# Patient Record
Sex: FEMALE | Race: OTHER | HISPANIC OR LATINO | URBAN - METROPOLITAN AREA
[De-identification: names, ages, dates, MRNs, and addresses within clinical notes are randomized per-mention and may not be internally consistent; named-entity substitution may affect disease eponyms.]

---

## 2022-03-30 ENCOUNTER — OUTPATIENT (OUTPATIENT)
Dept: OUTPATIENT SERVICES | Facility: HOSPITAL | Age: 47
LOS: 1 days | Discharge: HOME | End: 2022-03-30
Payer: MEDICAID

## 2022-03-30 VITALS
WEIGHT: 156.53 LBS | SYSTOLIC BLOOD PRESSURE: 141 MMHG | OXYGEN SATURATION: 99 % | TEMPERATURE: 98 F | RESPIRATION RATE: 15 BRPM | DIASTOLIC BLOOD PRESSURE: 87 MMHG | HEIGHT: 62 IN | HEART RATE: 87 BPM

## 2022-03-30 DIAGNOSIS — Z01.818 ENCOUNTER FOR OTHER PREPROCEDURAL EXAMINATION: ICD-10-CM

## 2022-03-30 DIAGNOSIS — G56.01 CARPAL TUNNEL SYNDROME, RIGHT UPPER LIMB: ICD-10-CM

## 2022-03-30 DIAGNOSIS — Z90.3 ACQUIRED ABSENCE OF STOMACH [PART OF]: Chronic | ICD-10-CM

## 2022-03-30 DIAGNOSIS — Z90.49 ACQUIRED ABSENCE OF OTHER SPECIFIED PARTS OF DIGESTIVE TRACT: Chronic | ICD-10-CM

## 2022-03-30 LAB
ALBUMIN SERPL ELPH-MCNC: 4.2 G/DL — SIGNIFICANT CHANGE UP (ref 3.5–5.2)
ALP SERPL-CCNC: 77 U/L — SIGNIFICANT CHANGE UP (ref 30–115)
ALT FLD-CCNC: 12 U/L — SIGNIFICANT CHANGE UP (ref 0–41)
ANION GAP SERPL CALC-SCNC: 14 MMOL/L — SIGNIFICANT CHANGE UP (ref 7–14)
AST SERPL-CCNC: 14 U/L — SIGNIFICANT CHANGE UP (ref 0–41)
BASOPHILS # BLD AUTO: 0.07 K/UL — SIGNIFICANT CHANGE UP (ref 0–0.2)
BASOPHILS NFR BLD AUTO: 1 % — SIGNIFICANT CHANGE UP (ref 0–1)
BILIRUB SERPL-MCNC: 0.3 MG/DL — SIGNIFICANT CHANGE UP (ref 0.2–1.2)
BUN SERPL-MCNC: 14 MG/DL — SIGNIFICANT CHANGE UP (ref 10–20)
CALCIUM SERPL-MCNC: 9.5 MG/DL — SIGNIFICANT CHANGE UP (ref 8.5–10.1)
CHLORIDE SERPL-SCNC: 102 MMOL/L — SIGNIFICANT CHANGE UP (ref 98–110)
CO2 SERPL-SCNC: 24 MMOL/L — SIGNIFICANT CHANGE UP (ref 17–32)
CREAT SERPL-MCNC: 0.6 MG/DL — LOW (ref 0.7–1.5)
EGFR: 113 ML/MIN/1.73M2 — SIGNIFICANT CHANGE UP
EOSINOPHIL # BLD AUTO: 0.34 K/UL — SIGNIFICANT CHANGE UP (ref 0–0.7)
EOSINOPHIL NFR BLD AUTO: 4.8 % — SIGNIFICANT CHANGE UP (ref 0–8)
GLUCOSE SERPL-MCNC: 74 MG/DL — SIGNIFICANT CHANGE UP (ref 70–99)
HCT VFR BLD CALC: 41.5 % — SIGNIFICANT CHANGE UP (ref 37–47)
HGB BLD-MCNC: 13.5 G/DL — SIGNIFICANT CHANGE UP (ref 12–16)
IMM GRANULOCYTES NFR BLD AUTO: 0.6 % — HIGH (ref 0.1–0.3)
LYMPHOCYTES # BLD AUTO: 2.3 K/UL — SIGNIFICANT CHANGE UP (ref 1.2–3.4)
LYMPHOCYTES # BLD AUTO: 32.2 % — SIGNIFICANT CHANGE UP (ref 20.5–51.1)
MCHC RBC-ENTMCNC: 29.2 PG — SIGNIFICANT CHANGE UP (ref 27–31)
MCHC RBC-ENTMCNC: 32.5 G/DL — SIGNIFICANT CHANGE UP (ref 32–37)
MCV RBC AUTO: 89.6 FL — SIGNIFICANT CHANGE UP (ref 81–99)
MONOCYTES # BLD AUTO: 0.56 K/UL — SIGNIFICANT CHANGE UP (ref 0.1–0.6)
MONOCYTES NFR BLD AUTO: 7.8 % — SIGNIFICANT CHANGE UP (ref 1.7–9.3)
NEUTROPHILS # BLD AUTO: 3.83 K/UL — SIGNIFICANT CHANGE UP (ref 1.4–6.5)
NEUTROPHILS NFR BLD AUTO: 53.6 % — SIGNIFICANT CHANGE UP (ref 42.2–75.2)
NRBC # BLD: 0 /100 WBCS — SIGNIFICANT CHANGE UP (ref 0–0)
PLATELET # BLD AUTO: 299 K/UL — SIGNIFICANT CHANGE UP (ref 130–400)
POTASSIUM SERPL-MCNC: 4.2 MMOL/L — SIGNIFICANT CHANGE UP (ref 3.5–5)
POTASSIUM SERPL-SCNC: 4.2 MMOL/L — SIGNIFICANT CHANGE UP (ref 3.5–5)
PROT SERPL-MCNC: 7.3 G/DL — SIGNIFICANT CHANGE UP (ref 6–8)
RBC # BLD: 4.63 M/UL — SIGNIFICANT CHANGE UP (ref 4.2–5.4)
RBC # FLD: 15.3 % — HIGH (ref 11.5–14.5)
SODIUM SERPL-SCNC: 140 MMOL/L — SIGNIFICANT CHANGE UP (ref 135–146)
WBC # BLD: 7.14 K/UL — SIGNIFICANT CHANGE UP (ref 4.8–10.8)
WBC # FLD AUTO: 7.14 K/UL — SIGNIFICANT CHANGE UP (ref 4.8–10.8)

## 2022-03-30 PROCEDURE — 93010 ELECTROCARDIOGRAM REPORT: CPT

## 2022-03-30 NOTE — H&P PST ADULT - REASON FOR ADMISSION
PT SCHEDULED FOR-- RIGHT HAND OPEN CARPAL TUNNEL RELEASE, RIGHT WRIST DEQUERVAINS RELEASE  Laterality: Right Length of Procedure: 45 Minutes Anesthesia Type: Local Standby  PT REPORTS--MY RIGHT HAND AND RIGHT WRIST HAVE BEEN BOTHERING ME SINCE 10/21.  THE PAIN HAS GOTTEN WORSE RECENTLY. THE PAIN IS 8/10. IT IS A PRESSURE, BURNING AND RADIATING PAIN.   PT POINTS TO HER RIGHT ARM.   SHOWING THE PAIN RADIATING  UP AND DOWN HER RIGHT. ARM.  AS PER PT- NOTHING TAKES THE PAIN AWAY.

## 2022-03-30 NOTE — H&P PST ADULT - HISTORY OF PRESENT ILLNESS
PT PRESENTS TO PAST WITH NO SOB, CP, PALPITATIONS, DYSURIA, UTI OR URI AT PRESENT.   PT ABLE TO WALK UP 2-3 FLIGHTS OF STEPS WITH NO SOB.  AS PER THE PT, THIS IS HIS/HER COMPLETE MEDICAL AND SURGICAL HX, INCLUDING MEDICATIONS PRESCRIBED AND OVER THE COUNTER  pt denies any covid s/s, 1/22  tested positive.   PT AWARE OF DATE AND TIME OF COVID TESTING PRIOR TO SURGERY.   pt advised self quarantine till day of procedure  denies travel outside the [place] in the past 30 days  Anesthesia Alert  NO--Difficult Airway  NO--History of neck surgery or radiation  NO--Limited ROM of neck  NO--History of Malignant hyperthermia  NO--Personal or family history of Pseudocholinesterase deficiency  NO--Prior Anesthesia Complication  NO--Latex Allergy  NO--Loose teeth  NO--History of Rheumatoid Arthritis  NO--BEBETO  NO BLEEDING RISK  NO--Other_____

## 2022-03-30 NOTE — H&P PST ADULT - NSANTHOSAYNRD_GEN_A_CORE
No. BEBETO screening performed.  STOP BANG Legend: 0-2 = LOW Risk; 3-4 = INTERMEDIATE Risk; 5-8 = HIGH Risk

## 2022-03-30 NOTE — H&P PST ADULT - EXTREMITIES COMMENTS
RIGHT HAND BRACE  FROM ABOVE FINGERS AND ABOVE WRIST.  PT ABLE TO ROTATE WRIST FREELY.  FINGERS PINK AND WARM TO TOUCH.

## 2022-04-13 ENCOUNTER — OUTPATIENT (OUTPATIENT)
Dept: OUTPATIENT SERVICES | Facility: HOSPITAL | Age: 47
LOS: 1 days | Discharge: HOME | End: 2022-04-13

## 2022-04-13 ENCOUNTER — TRANSCRIPTION ENCOUNTER (OUTPATIENT)
Age: 47
End: 2022-04-13

## 2022-04-13 VITALS
WEIGHT: 156.53 LBS | HEIGHT: 62 IN | OXYGEN SATURATION: 98 % | RESPIRATION RATE: 20 BRPM | HEART RATE: 57 BPM | TEMPERATURE: 99 F | DIASTOLIC BLOOD PRESSURE: 81 MMHG | SYSTOLIC BLOOD PRESSURE: 126 MMHG

## 2022-04-13 VITALS
RESPIRATION RATE: 15 BRPM | SYSTOLIC BLOOD PRESSURE: 146 MMHG | OXYGEN SATURATION: 100 % | DIASTOLIC BLOOD PRESSURE: 93 MMHG | HEART RATE: 52 BPM

## 2022-04-13 DIAGNOSIS — Z90.3 ACQUIRED ABSENCE OF STOMACH [PART OF]: Chronic | ICD-10-CM

## 2022-04-13 DIAGNOSIS — Z90.49 ACQUIRED ABSENCE OF OTHER SPECIFIED PARTS OF DIGESTIVE TRACT: Chronic | ICD-10-CM

## 2022-04-13 RX ORDER — IBUPROFEN 200 MG
1 TABLET ORAL
Qty: 20 | Refills: 0
Start: 2022-04-13

## 2022-04-13 RX ORDER — ONDANSETRON 8 MG/1
4 TABLET, FILM COATED ORAL ONCE
Refills: 0 | Status: DISCONTINUED | OUTPATIENT
Start: 2022-04-13 | End: 2022-04-27

## 2022-04-13 RX ORDER — HYDROMORPHONE HYDROCHLORIDE 2 MG/ML
0.5 INJECTION INTRAMUSCULAR; INTRAVENOUS; SUBCUTANEOUS
Refills: 0 | Status: DISCONTINUED | OUTPATIENT
Start: 2022-04-13 | End: 2022-04-13

## 2022-04-13 RX ORDER — SODIUM CHLORIDE 9 MG/ML
1000 INJECTION, SOLUTION INTRAVENOUS
Refills: 0 | Status: DISCONTINUED | OUTPATIENT
Start: 2022-04-13 | End: 2022-04-27

## 2022-04-13 RX ADMIN — SODIUM CHLORIDE 100 MILLILITER(S): 9 INJECTION, SOLUTION INTRAVENOUS at 08:17

## 2022-04-13 NOTE — BRIEF OPERATIVE NOTE - NSICDXBRIEFPOSTOP_GEN_ALL_CORE_FT
POST-OP DIAGNOSIS:  Right carpal tunnel syndrome 13-Apr-2022 07:07:45  Harjeet Meek  Tendinitis, de Quervain's 13-Apr-2022 07:07:49  Harjeet Meek

## 2022-04-13 NOTE — BRIEF OPERATIVE NOTE - NSICDXBRIEFPREOP_GEN_ALL_CORE_FT
PRE-OP DIAGNOSIS:  Right carpal tunnel syndrome 13-Apr-2022 07:07:34  Harjeet Meek  Tendinitis, de Quervain's 13-Apr-2022 07:07:38  Harjeet Meek

## 2022-04-13 NOTE — ASU DISCHARGE PLAN (ADULT/PEDIATRIC) - CARE PROVIDER_API CALL
Harjeet Meek)  Orthopaedic Surgery  [address] [place] [address]  Phone: [phone]  Fax: [phone]  Follow Up Time:

## 2022-04-13 NOTE — CHART NOTE - NSCHARTNOTEFT_GEN_A_CORE
PACU ANESTHESIA ADMISSION NOTE      Procedure:   Post op diagnosis:      ____  Intubated  TV:______       Rate: ______      FiO2: ______    __x__  Patent Airway    __x__  Full return of protective reflexes    __x__  Full recovery from anesthesia / back to baseline     Vitals:      See Anesthesia Record      Mental Status:  _x___ Awake   __x___ Alert   _____ Drowsy   _____ Sedated    Nausea/Vomiting:  _x___ NO  ______Yes,   __x__ See Post - Op Orders          Pain Scale (0-10):  __0___    Treatment: ____ None    __x__ See Post - Op/PCA Orders    Post - Operative Fluids:   ____ Oral   __x__ See Post - Op Orders    Plan: Discharge:   _x___Home       _____Floor     _____Critical Care    _____  Other:_________________    Comments: Uneventful anesthesia. Patient transported to PACU awake and responsive, spontaneously breathing and hemodynamically stable. Report given to PACU RN at bedside

## 2022-04-13 NOTE — BRIEF OPERATIVE NOTE - NSICDXBRIEFPROCEDURE_GEN_ALL_CORE_FT
PROCEDURES:  Carpal tunnel surgery 13-Apr-2022 07:07:23  Harjeet Meek Quervain release 13-Apr-2022 07:07:27  Harjeet Meek

## 2022-04-19 DIAGNOSIS — G56.01 CARPAL TUNNEL SYNDROME, RIGHT UPPER LIMB: ICD-10-CM

## 2022-04-19 DIAGNOSIS — M65.4 RADIAL STYLOID TENOSYNOVITIS [DE QUERVAIN]: ICD-10-CM

## 2023-08-12 ENCOUNTER — EMERGENCY (EMERGENCY)
Facility: HOSPITAL | Age: 48
LOS: 0 days | Discharge: LEFT BEFORE DISCHARGE | End: 2023-08-13
Attending: EMERGENCY MEDICINE
Payer: MEDICAID

## 2023-08-12 VITALS
WEIGHT: 169.98 LBS | RESPIRATION RATE: 17 BRPM | SYSTOLIC BLOOD PRESSURE: 136 MMHG | OXYGEN SATURATION: 100 % | HEIGHT: 62 IN | HEART RATE: 85 BPM | DIASTOLIC BLOOD PRESSURE: 84 MMHG | TEMPERATURE: 99 F

## 2023-08-12 DIAGNOSIS — Z90.3 ACQUIRED ABSENCE OF STOMACH [PART OF]: Chronic | ICD-10-CM

## 2023-08-12 DIAGNOSIS — Z98.84 BARIATRIC SURGERY STATUS: ICD-10-CM

## 2023-08-12 DIAGNOSIS — R10.32 LEFT LOWER QUADRANT PAIN: ICD-10-CM

## 2023-08-12 DIAGNOSIS — Z90.49 ACQUIRED ABSENCE OF OTHER SPECIFIED PARTS OF DIGESTIVE TRACT: Chronic | ICD-10-CM

## 2023-08-12 DIAGNOSIS — Z53.29 PROCEDURE AND TREATMENT NOT CARRIED OUT BECAUSE OF PATIENT'S DECISION FOR OTHER REASONS: ICD-10-CM

## 2023-08-12 DIAGNOSIS — K57.92 DIVERTICULITIS OF INTESTINE, PART UNSPECIFIED, WITHOUT PERFORATION OR ABSCESS WITHOUT BLEEDING: ICD-10-CM

## 2023-08-12 DIAGNOSIS — R11.0 NAUSEA: ICD-10-CM

## 2023-08-12 DIAGNOSIS — J45.909 UNSPECIFIED ASTHMA, UNCOMPLICATED: ICD-10-CM

## 2023-08-12 PROCEDURE — 81003 URINALYSIS AUTO W/O SCOPE: CPT

## 2023-08-12 PROCEDURE — 99285 EMERGENCY DEPT VISIT HI MDM: CPT

## 2023-08-12 PROCEDURE — 99285 EMERGENCY DEPT VISIT HI MDM: CPT | Mod: 25

## 2023-08-12 PROCEDURE — 85018 HEMOGLOBIN: CPT

## 2023-08-12 PROCEDURE — 96374 THER/PROPH/DIAG INJ IV PUSH: CPT

## 2023-08-12 PROCEDURE — 71045 X-RAY EXAM CHEST 1 VIEW: CPT

## 2023-08-12 PROCEDURE — 84484 ASSAY OF TROPONIN QUANT: CPT

## 2023-08-12 PROCEDURE — 96375 TX/PRO/DX INJ NEW DRUG ADDON: CPT

## 2023-08-12 PROCEDURE — 83605 ASSAY OF LACTIC ACID: CPT

## 2023-08-12 PROCEDURE — 74177 CT ABD & PELVIS W/CONTRAST: CPT | Mod: MA

## 2023-08-12 PROCEDURE — 84703 CHORIONIC GONADOTROPIN ASSAY: CPT

## 2023-08-12 PROCEDURE — 83690 ASSAY OF LIPASE: CPT

## 2023-08-12 PROCEDURE — 82803 BLOOD GASES ANY COMBINATION: CPT

## 2023-08-12 PROCEDURE — 84132 ASSAY OF SERUM POTASSIUM: CPT

## 2023-08-12 PROCEDURE — 80048 BASIC METABOLIC PNL TOTAL CA: CPT

## 2023-08-12 PROCEDURE — 80307 DRUG TEST PRSMV CHEM ANLYZR: CPT

## 2023-08-12 PROCEDURE — 85014 HEMATOCRIT: CPT

## 2023-08-12 PROCEDURE — 82330 ASSAY OF CALCIUM: CPT

## 2023-08-12 PROCEDURE — 93005 ELECTROCARDIOGRAM TRACING: CPT

## 2023-08-12 PROCEDURE — 80076 HEPATIC FUNCTION PANEL: CPT

## 2023-08-12 PROCEDURE — 85025 COMPLETE CBC W/AUTO DIFF WBC: CPT

## 2023-08-12 PROCEDURE — 76830 TRANSVAGINAL US NON-OB: CPT

## 2023-08-12 PROCEDURE — 84295 ASSAY OF SERUM SODIUM: CPT

## 2023-08-12 PROCEDURE — 36415 COLL VENOUS BLD VENIPUNCTURE: CPT

## 2023-08-12 RX ORDER — MORPHINE SULFATE 50 MG/1
4 CAPSULE, EXTENDED RELEASE ORAL ONCE
Refills: 0 | Status: DISCONTINUED | OUTPATIENT
Start: 2023-08-12 | End: 2023-08-12

## 2023-08-12 RX ORDER — DIATRIZOATE MEGLUMINE 180 MG/ML
30 INJECTION, SOLUTION INTRAVESICAL ONCE
Refills: 0 | Status: COMPLETED | OUTPATIENT
Start: 2023-08-12 | End: 2023-08-12

## 2023-08-12 RX ORDER — SODIUM CHLORIDE 9 MG/ML
1000 INJECTION INTRAMUSCULAR; INTRAVENOUS; SUBCUTANEOUS ONCE
Refills: 0 | Status: COMPLETED | OUTPATIENT
Start: 2023-08-12 | End: 2023-08-12

## 2023-08-12 NOTE — ED ADULT TRIAGE NOTE - AS TEMP SITE
"-- Message is from the [facility]--    Reason for Call: The patients dad is calling in wanting to know what was prescribe for the patient doctors office for today. I told him I see the one medication the doctor prescribed but that [facility] sees two medications in there. Patients dad wants to know if he has to get that other medication over the counter. That medication is [place] Laxative Drops 1.5 mL. Please call the patients dad back at [phone]       Type Contact Phone    01/08/2019 12:01 PM Phone (Incoming) Pratik Sylvester (Father) [phone]          Alternative phone number:     Turnaround time given to caller: ""This message will be sent to [facility] Provider's name]. The clinical team will fulfill your request as soon as they review your message. \""    " oral

## 2023-08-12 NOTE — ED ADULT TRIAGE NOTE - CHIEF COMPLAINT QUOTE
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 Now the pain is getting worse here (left upper and lower quadrants). I did go to the bathroom, it helped the pain a little but not much - patient

## 2023-08-13 PROBLEM — J45.909 UNSPECIFIED ASTHMA, UNCOMPLICATED: Chronic | Status: ACTIVE | Noted: 2022-03-30

## 2023-08-13 LAB
ALBUMIN SERPL ELPH-MCNC: 4.3 G/DL — SIGNIFICANT CHANGE UP (ref 3.5–5.2)
ALP SERPL-CCNC: 95 U/L — SIGNIFICANT CHANGE UP (ref 30–115)
ALT FLD-CCNC: 14 U/L — SIGNIFICANT CHANGE UP (ref 0–41)
ANION GAP SERPL CALC-SCNC: 13 MMOL/L — SIGNIFICANT CHANGE UP (ref 7–14)
APPEARANCE UR: CLEAR — SIGNIFICANT CHANGE UP
AST SERPL-CCNC: 17 U/L — SIGNIFICANT CHANGE UP (ref 0–41)
BASE EXCESS BLDV CALC-SCNC: 2.5 MMOL/L — SIGNIFICANT CHANGE UP (ref -2–3)
BASOPHILS # BLD AUTO: 0.08 K/UL — SIGNIFICANT CHANGE UP (ref 0–0.2)
BASOPHILS NFR BLD AUTO: 0.5 % — SIGNIFICANT CHANGE UP (ref 0–1)
BILIRUB DIRECT SERPL-MCNC: <0.2 MG/DL — SIGNIFICANT CHANGE UP (ref 0–0.3)
BILIRUB INDIRECT FLD-MCNC: SIGNIFICANT CHANGE UP MG/DL (ref 0.2–1.2)
BILIRUB SERPL-MCNC: 0.3 MG/DL — SIGNIFICANT CHANGE UP (ref 0.2–1.2)
BILIRUB UR-MCNC: NEGATIVE — SIGNIFICANT CHANGE UP
BUN SERPL-MCNC: 18 MG/DL — SIGNIFICANT CHANGE UP (ref 10–20)
CA-I SERPL-SCNC: 1.22 MMOL/L — SIGNIFICANT CHANGE UP (ref 1.15–1.33)
CALCIUM SERPL-MCNC: 9.5 MG/DL — SIGNIFICANT CHANGE UP (ref 8.4–10.5)
CHLORIDE SERPL-SCNC: 100 MMOL/L — SIGNIFICANT CHANGE UP (ref 98–110)
CO2 SERPL-SCNC: 23 MMOL/L — SIGNIFICANT CHANGE UP (ref 17–32)
COLOR SPEC: YELLOW — SIGNIFICANT CHANGE UP
CREAT SERPL-MCNC: 0.9 MG/DL — SIGNIFICANT CHANGE UP (ref 0.7–1.5)
DIFF PNL FLD: NEGATIVE — SIGNIFICANT CHANGE UP
EGFR: 80 ML/MIN/1.73M2 — SIGNIFICANT CHANGE UP
EOSINOPHIL # BLD AUTO: 0.33 K/UL — SIGNIFICANT CHANGE UP (ref 0–0.7)
EOSINOPHIL NFR BLD AUTO: 2.1 % — SIGNIFICANT CHANGE UP (ref 0–8)
ETHANOL SERPL-MCNC: <10 MG/DL — SIGNIFICANT CHANGE UP
GAS PNL BLDV: 134 MMOL/L — LOW (ref 136–145)
GAS PNL BLDV: SIGNIFICANT CHANGE UP
GLUCOSE SERPL-MCNC: 102 MG/DL — HIGH (ref 70–99)
GLUCOSE UR QL: NEGATIVE MG/DL — SIGNIFICANT CHANGE UP
HCG SERPL QL: NEGATIVE — SIGNIFICANT CHANGE UP
HCO3 BLDV-SCNC: 28 MMOL/L — SIGNIFICANT CHANGE UP (ref 22–29)
HCT VFR BLD CALC: 38.5 % — SIGNIFICANT CHANGE UP (ref 37–47)
HCT VFR BLDA CALC: 41 % — SIGNIFICANT CHANGE UP (ref 39–51)
HGB BLD CALC-MCNC: 13.5 G/DL — SIGNIFICANT CHANGE UP (ref 12.6–17.4)
HGB BLD-MCNC: 13.1 G/DL — SIGNIFICANT CHANGE UP (ref 12–16)
IMM GRANULOCYTES NFR BLD AUTO: 0.3 % — SIGNIFICANT CHANGE UP (ref 0.1–0.3)
KETONES UR-MCNC: NEGATIVE MG/DL — SIGNIFICANT CHANGE UP
LACTATE BLDV-MCNC: 1.4 MMOL/L — SIGNIFICANT CHANGE UP (ref 0.5–2)
LACTATE SERPL-SCNC: 1.6 MMOL/L — SIGNIFICANT CHANGE UP (ref 0.7–2)
LEUKOCYTE ESTERASE UR-ACNC: NEGATIVE — SIGNIFICANT CHANGE UP
LIDOCAIN IGE QN: 44 U/L — SIGNIFICANT CHANGE UP (ref 7–60)
LYMPHOCYTES # BLD AUTO: 13.1 % — LOW (ref 20.5–51.1)
LYMPHOCYTES # BLD AUTO: 2.05 K/UL — SIGNIFICANT CHANGE UP (ref 1.2–3.4)
MCHC RBC-ENTMCNC: 31.1 PG — HIGH (ref 27–31)
MCHC RBC-ENTMCNC: 34 G/DL — SIGNIFICANT CHANGE UP (ref 32–37)
MCV RBC AUTO: 91.4 FL — SIGNIFICANT CHANGE UP (ref 81–99)
MONOCYTES # BLD AUTO: 1.15 K/UL — HIGH (ref 0.1–0.6)
MONOCYTES NFR BLD AUTO: 7.4 % — SIGNIFICANT CHANGE UP (ref 1.7–9.3)
NEUTROPHILS # BLD AUTO: 11.95 K/UL — HIGH (ref 1.4–6.5)
NEUTROPHILS NFR BLD AUTO: 76.6 % — HIGH (ref 42.2–75.2)
NITRITE UR-MCNC: NEGATIVE — SIGNIFICANT CHANGE UP
NRBC # BLD: 0 /100 WBCS — SIGNIFICANT CHANGE UP (ref 0–0)
PCO2 BLDV: 48 MMHG — HIGH (ref 39–42)
PH BLDV: 7.38 — SIGNIFICANT CHANGE UP (ref 7.32–7.43)
PH UR: 5.5 — SIGNIFICANT CHANGE UP (ref 5–8)
PLATELET # BLD AUTO: 266 K/UL — SIGNIFICANT CHANGE UP (ref 130–400)
PMV BLD: 10.8 FL — HIGH (ref 7.4–10.4)
PO2 BLDV: 40 MMHG — SIGNIFICANT CHANGE UP
POTASSIUM BLDV-SCNC: 3.8 MMOL/L — SIGNIFICANT CHANGE UP (ref 3.5–5.1)
POTASSIUM SERPL-MCNC: 4.3 MMOL/L — SIGNIFICANT CHANGE UP (ref 3.5–5)
POTASSIUM SERPL-SCNC: 4.3 MMOL/L — SIGNIFICANT CHANGE UP (ref 3.5–5)
PROT SERPL-MCNC: 7.1 G/DL — SIGNIFICANT CHANGE UP (ref 6–8)
PROT UR-MCNC: NEGATIVE MG/DL — SIGNIFICANT CHANGE UP
RBC # BLD: 4.21 M/UL — SIGNIFICANT CHANGE UP (ref 4.2–5.4)
RBC # FLD: 13 % — SIGNIFICANT CHANGE UP (ref 11.5–14.5)
SAO2 % BLDV: 69.8 % — SIGNIFICANT CHANGE UP
SODIUM SERPL-SCNC: 136 MMOL/L — SIGNIFICANT CHANGE UP (ref 135–146)
SP GR SPEC: 1.02 — SIGNIFICANT CHANGE UP (ref 1–1.03)
TROPONIN T SERPL-MCNC: <0.01 NG/ML — SIGNIFICANT CHANGE UP
UROBILINOGEN FLD QL: 0.2 MG/DL — SIGNIFICANT CHANGE UP (ref 0.2–1)
WBC # BLD: 15.6 K/UL — HIGH (ref 4.8–10.8)
WBC # FLD AUTO: 15.6 K/UL — HIGH (ref 4.8–10.8)

## 2023-08-13 PROCEDURE — 74177 CT ABD & PELVIS W/CONTRAST: CPT | Mod: 26,MA

## 2023-08-13 PROCEDURE — 76830 TRANSVAGINAL US NON-OB: CPT | Mod: 26

## 2023-08-13 PROCEDURE — 93010 ELECTROCARDIOGRAM REPORT: CPT

## 2023-08-13 PROCEDURE — 71045 X-RAY EXAM CHEST 1 VIEW: CPT | Mod: 26

## 2023-08-13 RX ORDER — CEFTRIAXONE 500 MG/1
1000 INJECTION, POWDER, FOR SOLUTION INTRAMUSCULAR; INTRAVENOUS ONCE
Refills: 0 | Status: COMPLETED | OUTPATIENT
Start: 2023-08-13 | End: 2023-08-13

## 2023-08-13 RX ORDER — CIPROFLOXACIN LACTATE 400MG/40ML
1 VIAL (ML) INTRAVENOUS
Qty: 20 | Refills: 0
Start: 2023-08-13 | End: 2023-08-22

## 2023-08-13 RX ORDER — METRONIDAZOLE 500 MG
1 TABLET ORAL
Qty: 30 | Refills: 0
Start: 2023-08-13 | End: 2023-08-22

## 2023-08-13 RX ORDER — CIPROFLOXACIN LACTATE 400MG/40ML
400 VIAL (ML) INTRAVENOUS ONCE
Refills: 0 | Status: DISCONTINUED | OUTPATIENT
Start: 2023-08-13 | End: 2023-08-13

## 2023-08-13 RX ORDER — METRONIDAZOLE 500 MG
500 TABLET ORAL ONCE
Refills: 0 | Status: DISCONTINUED | OUTPATIENT
Start: 2023-08-13 | End: 2023-08-13

## 2023-08-13 RX ADMIN — Medication 125 MILLIGRAM(S): at 00:03

## 2023-08-13 RX ADMIN — DIATRIZOATE MEGLUMINE 30 MILLILITER(S): 180 INJECTION, SOLUTION INTRAVESICAL at 00:04

## 2023-08-13 RX ADMIN — Medication 200 MILLIGRAM(S): at 03:20

## 2023-08-13 RX ADMIN — SODIUM CHLORIDE 1000 MILLILITER(S): 9 INJECTION INTRAMUSCULAR; INTRAVENOUS; SUBCUTANEOUS at 00:04

## 2023-08-13 RX ADMIN — MORPHINE SULFATE 4 MILLIGRAM(S): 50 CAPSULE, EXTENDED RELEASE ORAL at 00:02

## 2023-08-13 RX ADMIN — CEFTRIAXONE 100 MILLIGRAM(S): 500 INJECTION, POWDER, FOR SOLUTION INTRAMUSCULAR; INTRAVENOUS at 03:19

## 2023-08-13 RX ADMIN — Medication 100 MILLIGRAM(S): at 03:19

## 2023-08-13 NOTE — ED PROVIDER NOTE - ATTENDING MD FREE TEXT FOR MDM DISCUSSED CASE WITH QUESTION
I Discussed with MAR, but patient changed her mind and signed out AMA, shortly after signing out the admission to MAR and prior to the bed request.

## 2023-08-13 NOTE — ED PROVIDER NOTE - CLINICAL SUMMARY MEDICAL DECISION MAKING FREE TEXT BOX
Laboratory results reviewed and discussed with patient. CBC shows elevated WBC count, BMP shows electrolyte in acceptable range and no JERONIMO. Lactate is WNL.   Pregnancy test is negative.   Patient remained stable in ED, improved well. Patient refused any further medical care in ED, refused admission, refused any further diagnostic studies or treatments. Discussed with patient in detail about clinical condition, and the need for further medical evaluation/treatments, patient verbalized understanding and still refused. Discussed with patient about the risks of leaving AMA, Patient verbalized understanding the information provided and still wanted to leave AMA. Patient is awake, alert, o x 3, coherent, and is capacitated to make decisions. Discussed with patient in detail about clinical condition, results of the diagnostic studies, was told to come back to ED if decide to continue with medical evaluation/treatments, and was discussed about  the need for close out patient follow up. Detail aftercare instructions and return precautions are given.  I had extensive discussion of risks and benefits of pursuing further medical evaluation and/or care with patient and any available family/friends; patient still electing to leave against medical advice. Patient is awake, alert, oriented and demonstrates full capacity and insight into illness. Patient aware and encouraged to return immediately to ED or nearest ED if patient decides to change mind regarding care or if patient experiences any new, worsening, or concerning symptoms.

## 2023-08-13 NOTE — ED PROVIDER NOTE - PHYSICAL EXAMINATION
CONST: Well appearing in NAD  EYES: PERRL, EOMI, Sclera and conjunctiva clear.   ENT: Oropharynx normal appearing, no erythema or exudates. Uvula midline.  CARD: Normal S1 S2; Normal rate and rhythm  RESP: Equal BS B/L, No wheezes, rhonchi or rales. No distress  GI: LLQ tenderness. No rebound or guarding. Abd soft, non distended.  MS: Normal ROM in all extremities. No midline spinal tenderness.  SKIN: Warm, dry, no acute rashes. Good turgor  NEURO: A&Ox3, No focal deficits.

## 2023-08-13 NOTE — ED PROVIDER NOTE - DIFFERENTIAL DIAGNOSIS
cardiac ischemia, cardiac arrhythmia, acute coronary syndromes, symptomatic anemia, electrolyte abnormalities including hyponatremia and hypokalemia, and pneumothorax. Pulmonary embolism and aotric dissection are less likely. Differential Diagnosis

## 2023-08-13 NOTE — CONSULT NOTE ADULT - ASSESSMENT
48yF w/ PMHx of asthma, HTN, s/p gastric sleeve @ [place] in 2016, s/p appendectomy who presented with LLQ abdominal pain associated with nausea x 1 day. Physical exam findings, imaging, and labs as documented above.     PLAN:  - Pending CT imaging  - Attending to see, plans pending    Above plan discussed with Attending Surgeon  ***  , patient, and ED team    SURGERY CONSULT SPECTRA: 6699 X1 or 6191 48yF w/ PMHx of asthma, HTN, s/p gastric sleeve @ [place] in 2016, s/p appendectomy who presented with LLQ abdominal pain associated with nausea x 1 day. Physical exam findings, imaging, and labs as documented above.     PLAN:  - No acute surgical intervention required at this time.  - Recommend antibiotics for 10-14 days  - If admitted to medicine, recommend serial abdominal exams  - Advance diet as tolerated  - Pain control  - Monitor for fevers, chills, worsening pain, nausea or vomiting.   - May follow-up outpatient with Dr. Gilliam for further management if needed.     Dispo per ED  Above plan discussed with Attending Surgeon Dr. Gilliam, patient, and ED team    SURGERY CONSULT SPECTRA: 6699 X1 or 1476 48yF w/ PMHx of asthma, HTN, s/p gastric sleeve @ [place] in 2016, s/p appendectomy who presented with LLQ abdominal pain associated with nausea x 1 day. Physical exam findings, imaging, and labs as documented above.     PLAN:  - No acute surgical intervention required at this time.  - Recommend admit to medicine for IV antibiotics for 10-14 days  - Recommend serial abdominal exams  - Advance diet as tolerated  - Pain control  - Monitor for fevers, chills, worsening pain, nausea or vomiting.   - Surgery to follow    Above plan discussed with Attending Surgeon Dr. Gilliam, patient, and ED team    SURGERY CONSULT SPECTRA: 6699 X1 or 0238 48yF w/ PMHx of asthma, HTN, s/p gastric sleeve @ [place] in 2016, s/p appendectomy who presented with LLQ abdominal pain associated with nausea x 1 day. Physical exam findings, imaging, and labs as documented above.     PLAN:  - No acute surgical intervention required at this time.  - Recommend admit to medicine for antibiotics and monitoring  - Patient should be on abx for at least 10 days  - Recommend serial abdominal exams  - Advance diet as tolerated  - Pain control  - Monitor for fevers, chills, worsening pain, nausea or vomiting.   - Trend WBC  - Surgery to follow    Above plan discussed with Attending Surgeon Dr. Gilliam, patient, and ED team    SURGERY CONSULT SPECTRA: 6699 X1 or 8246 48yF w/ PMHx of asthma, HTN, s/p gastric sleeve @ [place] in 2016, s/p appendectomy who presented with LLQ abdominal pain associated with nausea x 1 day. Physical exam findings, imaging, and labs as documented above.     PLAN:  - No acute surgical intervention  - Recommend admission for antibiotics and monitoring  - Patient should be on abx for at least 10 days  - Recommend serial abdominal exams  - Advance diet as tolerated when pain improved  - Pain control with OTC  - Monitor for fevers, chills, worsening pain, nausea or vomiting.   - Trend WBC  - Surgery to follow    Above plan discussed with Attending Surgeon Dr. Gilliam, patient, and ED team    SURGERY CONSULT SPECTRA: 6699 X1 or 4234

## 2023-08-13 NOTE — ED PROVIDER NOTE - CARE PROVIDER_API CALL
Cecil Schneider  Gastroenterology  [address] [place] [address]  Phone: [phone]  Fax: [phone]  Follow Up Time:    Cecil Schneider  Gastroenterology  [address] [place] [address]  Phone: [phone]  Fax: [phone]  Follow Up Time:     Dagoberto Gilliam  Surgery  [facility] [place] [address]  Phone: [phone]  Fax: [phone]  Follow Up Time:

## 2023-08-13 NOTE — ED PROVIDER NOTE - OBJECTIVE STATEMENT
47 yo female w. a Pmhx of  astham, s/p gastric sleeve presents to the ED complainig of abdominal pain. Patient w/ acute onset of LLQ abdominal pain this afternoon when trying to hold in a fart. Associated nauseas w/ vomiting. No fever, urinary sxs, hematuria, melena, hematochezia, hematemesis. 47 yo female w. a Pmhx of  astham, s/p gastric sleeve @ [place] in 2016 presents to the ED complainig of abdominal pain. Patient w/ acute onset of LLQ abdominal pain this afternoon when trying to hold in a fart. Associated nauseas w/o vomiting. No fever, urinary sxs, hematuria, melena, hematochezia, hematemesis.

## 2023-08-13 NOTE — ED PROVIDER NOTE - NSFOLLOWUPINSTRUCTIONS_ED_ALL_ED_FT
Our Emergency Department Referral Coordinators will be reaching out to you in the next 24-48 hours from 9:00am to 5:00pm with a follow up appointment. Please expect a phone call from the hospital in that time frame. If you do not receive a call or if you have any questions or concerns, you can reach them at   [phone]    Diverticulitis    Diverticulitis is infection or inflammation of small pouches (diverticula) in the colon that form due to a condition called diverticulosis. Diverticula can trap stool (feces) and bacteria, causing infection and inflammation.    Diverticulitis may cause severe stomach pain and diarrhea. It may lead to tissue damage in the colon that causes bleeding. The diverticula may also burst (rupture) and cause infected stool to enter other areas of the abdomen.    Complications of diverticulitis can include:  Bleeding.  Severe infection.  Severe pain.  Rupture (perforation) of the colon.  Blockage (obstruction) of the colon.  What are the causes?  This condition is caused by stool becoming trapped in the diverticula, which allows bacteria to grow in the diverticula. This leads to inflammation and infection.    What increases the risk?  You are more likely to develop this condition if:  You have diverticulosis. The risk for diverticulosis increases if:  You are overweight or obese.  You use tobacco products.  You do not get enough exercise.  You eat a diet that does not include enough fiber. High-fiber foods include fruits, vegetables, beans, nuts, and whole grains.  What are the signs or symptoms?  Symptoms of this condition may include:  Pain and tenderness in the abdomen. The pain is normally located on the left side of the abdomen, but it may occur in other areas.  Fever and chills.  Bloating.  Cramping.  Nausea.  Vomiting.  Changes in bowel routines.  Blood in your stool.  How is this diagnosed?  This condition is diagnosed based on:  Your medical history.  A physical exam.  Tests to make sure there is nothing else causing your condition. These tests may include:  Blood tests.  Urine tests.  Imaging tests of the abdomen, including X-rays, ultrasounds, MRIs, or CT scans.  How is this treated?  Most cases of this condition are mild and can be treated at home. Treatment may include:  Taking over-the-counter pain medicines.  Following a clear liquid diet.  Taking antibiotic medicines by mouth.  Rest.  More severe cases may need to be treated at a hospital. Treatment may include:  Not eating or drinking.  Taking prescription pain medicine.  Receiving antibiotic medicines through an IV tube.  Receiving fluids and nutrition through an IV tube.  Surgery.  When your condition is under control, your health care provider may recommend that you have a colonoscopy. This is an exam to look at the entire large intestine. During the exam, a lubricated, bendable tube is inserted into the anus and then passed into the rectum, colon, and other parts of the large intestine. A colonoscopy can show how severe your diverticula are and whether something else may be causing your symptoms.    Follow these instructions at home:  Medicines     Take over-the-counter and prescription medicines only as told by your health care provider. These include fiber supplements, probiotics, and stool softeners.  If you were prescribed an antibiotic medicine, take it as told by your health care provider. Do not stop taking the antibiotic even if you start to feel better.  Do not drive or use heavy machinery while taking prescription pain medicine.  General instructions     Follow a full liquid diet or another diet as directed by your health care provider. After your symptoms improve, your health care provider may tell you to change your diet. He or she may recommend that you eat a diet that contains at least 25 g (25 grams) of fiber daily. Fiber makes it easier to pass stool. Healthy sources of fiber include:  Berries. One cup contains 4–8 grams of fiber.  Beans or lentils. One half cup contains 5–8 grams of fiber.  Green vegetables. One cup contains 4 grams of fiber.  Exercise for at least 30 minutes, 3 times each week. You should exercise hard enough to raise your heart rate and break a sweat.  Keep all follow-up visits as told by your health care provider. This is important. You may need a colonoscopy.  Contact a health care provider if:  Your pain does not improve.  You have a hard time drinking or eating food.  Your bowel movements do not return to normal.  Get help right away if:  Your pain gets worse.  Your symptoms do not get better with treatment.  Your symptoms suddenly get worse.  You have a fever.  You vomit more than one time.  You have stools that are bloody, black, or tarry.  Summary  Diverticulitis is infection or inflammation of small pouches (diverticula) in the colon that form due to a condition called diverticulosis. Diverticula can trap stool (feces) and bacteria, causing infection and inflammation.  You are at higher risk for this condition if you have diverticulosis and you eat a diet that does not include enough fiber.  Most cases of this condition are mild and can be treated at home. More severe cases may need to be treated at a hospital.  When your condition is under control, your health care provider may recommend that you have an exam called a colonoscopy. This exam can show how severe your diverticula are and whether something else may be causing your symptoms.  This information is not intended to replace advice given to you by your health care provider. Make sure you discuss any questions you have with your health care provider.

## 2023-08-13 NOTE — CONSULT NOTE ADULT - SUBJECTIVE AND OBJECTIVE BOX
GENERAL SURGERY CONSULT NOTE    Patient: ERICA PITTS , 48y ([birth year])Female   MRN: [id number]  Location: [facility] ED  Visit: 23 Emergency  Date: 23 @ 01:17    HPI: 48y Female with PMHx of asthma, HTN, s/p gastric sleeve at [facility] in , s/p appendectomy presents to the ED for abdominal pain x 1 day. Patient states she was at work when she stopped herself from passing flatus. She reports that she began having LLQ pain around 6pm. Reports having a BM, but continued to have lots of pain in that area. Admits to nausea, but no vomiting. Unable to tolerate PO intake due to nausea. Denies any fevers, chills, changes in BM, diarrhea, constipation. Denies having a colonoscopy done. Of note, patient had gastric sleeve done at [place] in  (cannot recall name of the surgeon) and lost 75lbs after the surgery.  She states she had no complications related to or after the surgery.     PAST MEDICAL & SURGICAL HISTORY:  Asthma  HTN  H/O gastric sleeve  History of appendectomy    Home Medications:  Albuterol (Eqv-ProAir HFA) 90 mcg/inh inhalation aerosol: 2 puff(s) inhaled every 6 hours (12 May 2022 15:16)  Losartan 100mg    VITALS:  T(F): 98.8 (23 @ 23:22), Max: 98.8 (23 @ 23:22)  HR: 85 (23 @ 23:22) (85 - 85)  BP: 136/84 (23 @ 23:22) (136/84 - 136/84)  RR: 17 (23 @ 23:22) (17 - 17)  SpO2: 100% (23 @ 23:22) (100% - 100%)    PHYSICAL EXAM:  General: NAD, AAOx3, calm and cooperative  Cardiac: RRR  Respiratory: Chest rise equal bilaterally, no labored breathing  Abdomen: Soft, non-distended, LLQ tenderness present, no rebound, no guarding, appendectomy scar noted in RLQ  Extremities: ROMERO, soft  Skin: Warm/dry, normal color, no jaundice      LAB/STUDIES:             13.1   15.60 )-----------( 266      ( 13 Aug 2023 00:25 )             38.5     Urinalysis Basic - ( 13 Aug 2023 00:25 )    Color: Yellow / Appearance: Clear / S.023 / pH: x  Gluc: x / Ketone: Negative mg/dL  / Bili: Negative / Urobili: 0.2 mg/dL   Blood: x / Protein: Negative mg/dL / Nitrite: Negative   Leuk Esterase: Negative / RBC: x / WBC x   Sq Epi: x / Non Sq Epi: x / Bacteria: x      CARDIAC MARKERS ( 13 Aug 2023 00:25 )  x     / <0.01 ng/mL / x     / x     / x          IMAGING:  ***PENDING***   GENERAL SURGERY CONSULT NOTE    Patient: ERICA PITTS , 48y ([birth year])Female   MRN: [id number]  Location: [facility] ED  Visit: 23 Emergency  Date: 23 @ 01:17    HPI: 48y Female with PMHx of asthma, HTN, s/p gastric sleeve at [facility] in , and s/p open appendectomy at age 14 presents to the ED for abdominal pain x 1 day. Patient states she was at work when she stopped herself from passing flatus. She reports that she began having LLQ pain around 6pm. Reports having a BM, but continued to have lots of pain in that area. Admits to nausea, but no vomiting. Unable to tolerate PO intake due to nausea. Denies any fevers, chills, changes in BM, diarrhea, constipation. Denies having a colonoscopy done. Of note, patient had gastric sleeve done at [place] in  (cannot recall name of the surgeon) and lost 75lbs after the surgery (original weight 220lbs, then regained weight and currently weighs 170).  She states she had no complications related to or after the surgery.     PAST MEDICAL & SURGICAL HISTORY:  Asthma  HTN  H/O gastric sleeve  History of appendectomy    Home Medications:  Albuterol (Eqv-ProAir HFA) 90 mcg/inh inhalation aerosol: 2 puff(s) inhaled every 6 hours (12 May 2022 15:16)  Losartan 100mg    VITALS:  T(F): 98.8 (23 @ 23:22), Max: 98.8 (23 @ 23:22)  HR: 85 (23 @ 23:22) (85 - 85)  BP: 136/84 (23 @ 23:22) (136/84 - 136/84)  RR: 17 (23 @ 23:22) (17 - 17)  SpO2: 100% (23 @ 23:22) (100% - 100%)    PHYSICAL EXAM:  General: NAD, AAOx3, calm and cooperative  Cardiac: RRR  Respiratory: Chest rise equal bilaterally, no labored breathing  Abdomen: Soft, non-distended, LLQ tenderness present, no rebound, no guarding, appendectomy scar noted in RLQ  Extremities: ROMERO, soft  Skin: Warm/dry, normal color, no jaundice    LAB/STUDIES:             13.1   15.60 )-----------( 266      ( 13 Aug 2023 00:25 )             38.5     Urinalysis Basic - ( 13 Aug 2023 00:25 )    Color: Yellow / Appearance: Clear / S.023 / pH: x  Gluc: x / Ketone: Negative mg/dL  / Bili: Negative / Urobili: 0.2 mg/dL   Blood: x / Protein: Negative mg/dL / Nitrite: Negative   Leuk Esterase: Negative / RBC: x / WBC x   Sq Epi: x / Non Sq Epi: x / Bacteria: x      CARDIAC MARKERS ( 13 Aug 2023 00:25 )  x     / <0.01 ng/mL / x     / x     / x        IMAGING:  ***PENDING***   GENERAL SURGERY CONSULT NOTE    Patient: ERICA PITTS , 48y ([birth year])Female   MRN: [id number]  Location: [facility] ED  Visit: 23 Emergency  Date: 23 @ 01:17    HPI: 48y Female with PMHx of asthma, HTN, s/p gastric sleeve at [facility] in , and s/p open appendectomy at age 14 presents to the ED for abdominal pain x 1 day. Patient states she was at work when she stopped herself from passing flatus. She reports that she began having LLQ pain around 6pm. Reports having a BM, but continued to have lots of pain in that area. Admits to nausea, but no vomiting. Unable to tolerate PO intake due to nausea. Denies any fevers, chills, changes in BM, diarrhea, constipation. Denies having a colonoscopy done. Of note, patient had gastric sleeve done at [place] in  (cannot recall name of the surgeon) and lost 75lbs after the surgery (original weight 220lbs, then regained weight and currently weighs 170).  She states she had no complications related to or after the surgery.     PAST MEDICAL & SURGICAL HISTORY:  Asthma  HTN  H/O gastric sleeve  History of appendectomy    Home Medications:  Albuterol (Eqv-ProAir HFA) 90 mcg/inh inhalation aerosol: 2 puff(s) inhaled every 6 hours (12 May 2022 15:16)  Losartan 100mg    VITALS:  T(F): 98.8 (23 @ 23:22), Max: 98.8 (23 @ 23:22)  HR: 85 (23 @ 23:22) (85 - 85)  BP: 136/84 (23 @ 23:22) (136/84 - 136/84)  RR: 17 (23 @ 23:22) (17 - 17)  SpO2: 100% (23 @ 23:22) (100% - 100%)    PHYSICAL EXAM:  General: NAD, AAOx3, calm and cooperative  Cardiac: RRR  Respiratory: Chest rise equal bilaterally, no labored breathing  Abdomen: Soft, non-distended, LLQ tenderness present, no rebound, no guarding, appendectomy scar noted in RLQ  Extremities: ROMERO, soft  Skin: Warm/dry, normal color, no jaundice    LAB/STUDIES:             13.1   15.60 )-----------( 266      ( 13 Aug 2023 00:25 )             38.5     Urinalysis Basic - ( 13 Aug 2023 00:25 )    Color: Yellow / Appearance: Clear / S.023 / pH: x  Gluc: x / Ketone: Negative mg/dL  / Bili: Negative / Urobili: 0.2 mg/dL   Blood: x / Protein: Negative mg/dL / Nitrite: Negative   Leuk Esterase: Negative / RBC: x / WBC x   Sq Epi: x / Non Sq Epi: x / Bacteria: x      CARDIAC MARKERS ( 13 Aug 2023 00:25 )  x     / <0.01 ng/mL / x     / x     / x        IMAGING:  < from: CT Abdomen and Pelvis w/ Oral Cont and w/ IV Cont (23 @ 02:46) >  IMPRESSION:  Acute distal descending colon diverticulitis; no abscess.

## 2023-08-13 NOTE — ED PROVIDER NOTE - NS ED ATTENDING STATEMENT MOD
This was a shared visit with the GABBY. I reviewed and verified the documentation and independently performed the documented:

## 2023-08-13 NOTE — ED PROVIDER NOTE - ATTENDING APP SHARED VISIT CONTRIBUTION OF CARE
Patient states that, around 6 pm, suddenly started having severe abd pain, Lt flank/LUQ area, symptoms continued and did not improve, so had come to ED for evaluation. Patient denies trauma, denies f/c/cp/sob. Patient denies vaginal bleeding/discharge. LMP was 45 days ago and last BM was after the onset of abd pain. Denies constipation/diarrhea.   vitals reviewed.   Patient is awake, alert, appears uncomfortable and in pain.   Lungs: CTA, no wheezing, no crackles.  Abd: +BS, +LUQ/Lt flank tenderness, ND, soft, no hernias noted.   +voluntary guarding.   Back: No midline vertebral column tenderness, no pain on ROM,   CNS: awake, alert, o x 3, answering questions appropriately.   A/P: Lt flank/LUQ abd pain, sudden onset and severe pain,   Labs, r/o ectopic pregnancy,   US to evaluate for GYN pathology,   CT to r/o internal hernia/obstructive stone Vs. Perforation,   analgesia, EKG, CXR,   reevaluation.

## 2023-08-13 NOTE — ED PROVIDER NOTE - PROGRESS NOTE DETAILS
Surgery team in ED to evaluate patient. JS: Discussed all results with patient.  Scan showing acute diverticulitis with elevated WBC.  Patient wishing to AMA at this time with p.o. antibiotics. I have discussed the risks, benefits and alternatives (including the possibility of worsening of disease, pain, permanent disability, and/or death) with the patient and his/her family (if available).  The patient voices understanding of these risks, benefits, and alternatives and still wishes to sign out against medical advice.  The patient is awake, alert, oriented  x 3 and has demonstrated capacity to refuse/direct care.  I have advised the patient that they can and should return immediately should they develop any worse/different/additional symptoms, or if they change their mind and want to continue their care.

## 2023-08-13 NOTE — ED PROVIDER NOTE - PATIENT PORTAL LINK FT
You can access the FollowMyHealth Patient Portal offered by [facility] by registering at the following website: [email or web address]. By joining [employer]’s FollowMyHealth portal, you will also be able to view your health information using other applications (apps) compatible with our system.

## 2023-08-13 NOTE — ED PROVIDER NOTE - CONSULTANT FREE TEXT FOR MDM DISCUSSED CASE WITH QUESTION
Patient was evaluated by surgery in ED.   I consulted OBGYN over the TEAMS, patient signed out AMA prior to the OBGYN evaluation.

## 2023-08-13 NOTE — ED PROVIDER NOTE - PROVIDER TOKENS
PROVIDER:[TOKEN:[[id number]]] PROVIDER:[TOKEN:[[id number]]],PROVIDER:[TOKEN:[[id number]]]

## 2023-08-13 NOTE — ED ADULT NURSE NOTE - NSFALLUNIVINTERV_ED_ALL_ED
Bed/Stretcher in lowest position, wheels locked, appropriate side rails in place/Call bell, personal items and telephone in reach/Instruct patient to call for assistance before getting out of bed/chair/stretcher/Non-slip footwear applied when patient is off stretcher/[place] to call system/Physically safe environment - no spills, clutter or unnecessary equipment/Purposeful proactive rounding/Room/bathroom lighting operational, light cord in reach

## 2023-08-26 ENCOUNTER — TRANSCRIPTION ENCOUNTER (OUTPATIENT)
Age: 48
End: 2023-08-26

## 2023-11-29 ENCOUNTER — APPOINTMENT (OUTPATIENT)
Dept: ORTHOPEDIC SURGERY | Facility: CLINIC | Age: 48
End: 2023-11-29
Payer: MEDICAID

## 2023-11-29 VITALS — WEIGHT: 178 LBS | HEIGHT: 62 IN | BODY MASS INDEX: 32.76 KG/M2

## 2023-11-29 DIAGNOSIS — M77.8 OTHER ENTHESOPATHIES, NOT ELSEWHERE CLASSIFIED: ICD-10-CM

## 2023-11-29 PROBLEM — Z00.00 ENCOUNTER FOR PREVENTIVE HEALTH EXAMINATION: Status: ACTIVE | Noted: 2023-11-29

## 2023-11-29 PROCEDURE — 99213 OFFICE O/P EST LOW 20 MIN: CPT | Mod: 25

## 2023-11-29 PROCEDURE — 73110 X-RAY EXAM OF WRIST: CPT | Mod: LT

## 2023-11-29 RX ORDER — NABUMETONE 500 MG/1
500 TABLET, FILM COATED ORAL
Qty: 60 | Refills: 0 | Status: ACTIVE | COMMUNITY
Start: 2023-11-29 | End: 1900-01-01

## 2024-01-02 ENCOUNTER — APPOINTMENT (OUTPATIENT)
Dept: ORTHOPEDIC SURGERY | Facility: CLINIC | Age: 49
End: 2024-01-02
Payer: MEDICAID

## 2024-01-02 PROCEDURE — 20550 NJX 1 TENDON SHEATH/LIGAMENT: CPT | Mod: LT

## 2024-01-02 PROCEDURE — 99213 OFFICE O/P EST LOW 20 MIN: CPT | Mod: 25

## 2024-01-02 NOTE — ASSESSMENT
[FreeTextEntry1] : Patient has left-sided dequervains tendinitis.  Patient will receive her first cortisone injection today.  She tolerated well.  Will see how the injection does.  Hopefully help to relieve her symptoms.  If it does not potential for surgical intervention or second cortisone injection was discussed again today.

## 2024-01-02 NOTE — HISTORY OF PRESENT ILLNESS
[de-identified] : 48-year-old female did well from a right-sided de Quervain's release now has pain discomfort similarly on the left.  Comes in for evaluation.  Medications and bracing have not been helping.  She has not had any injection for the condition.

## 2024-01-02 NOTE — PHYSICAL EXAM
[de-identified] : Patient has positive Finkelstein's test left wrist.  Tender to palpation on the first dorsal compartment.  Good range of motion of the fingers and hand.  Normal capillary fill.

## 2024-01-02 NOTE — PROCEDURE
[FreeTextEntry3] : de Quervains injection was performed because of pain inflammation and stiffness Anesthesia: ethyl chloride sprayed topically Dexamethsone:  1 cc of 4mg/ml Lidocaine: An injection of Lidocaine 1% 1cc  Patient has tried OTC's including aspirin, Ibuprofen, Aleve etc or prescription NSAIDS, and/or exercises at home and/ or physical therapy without satisfactory response. After verbal consent using sterile preparation and technique. The risks, benefits, and alternatives to cortisone injection were explained in full to the patient. Risks outlined include but are not limited to infection, sepsis, bleeding, scarring, skin discoloration, temporary increase in pain, syncopal episode, failure to resolve symptoms, allergic reaction, symptom recurrence, and elevation of blood sugar in diabetics. Patient understood the risks. All questions were answered. After discussion of options, patient requested an injection. Oral informed consent was obtained and sterile prep was done of the injection site. Sterile technique was utilized for the procedure including the preparation of the solutions used for the injection. Patient tolerated the procedure well. Advised to ice the injection site this evening. Prep with Etoh locally to site. Sterile technique used tendon sheath of first compartment was injected  Left first dorsal compartment

## 2024-02-07 ENCOUNTER — APPOINTMENT (OUTPATIENT)
Dept: ORTHOPEDIC SURGERY | Facility: CLINIC | Age: 49
End: 2024-02-07
Payer: MEDICAID

## 2024-02-07 VITALS — BODY MASS INDEX: 32.76 KG/M2 | WEIGHT: 178 LBS | HEIGHT: 62 IN

## 2024-02-07 DIAGNOSIS — M65.4 RADIAL STYLOID TENOSYNOVITIS [DE QUERVAIN]: ICD-10-CM

## 2024-02-07 PROCEDURE — 20550 NJX 1 TENDON SHEATH/LIGAMENT: CPT

## 2024-02-07 PROCEDURE — 99213 OFFICE O/P EST LOW 20 MIN: CPT | Mod: 25

## 2024-02-07 NOTE — PHYSICAL EXAM
[de-identified] : Physical exam of her left wrist: Negative swelling or ecchymosis.  Nontender over the distal radius or distal ulna.  Pain of the radial styloid.  Pain of the first dorsal compartment.  Positive Finkelstein.   strength 5 out of 5.  Sensory and motor are intact.

## 2024-02-07 NOTE — HISTORY OF PRESENT ILLNESS
[de-identified] :  patient is a 48 old female here for evaluation of her left wrist.  She has a history of DeQuervein's tenosynovitis.  She had a cortisone injection about a month ago which did not give her any relief.  She had a history of a right DeQuervein's release done by Dr. Meek in the past.

## 2024-05-24 VITALS
WEIGHT: 177.91 LBS | DIASTOLIC BLOOD PRESSURE: 79 MMHG | RESPIRATION RATE: 16 BRPM | SYSTOLIC BLOOD PRESSURE: 122 MMHG | OXYGEN SATURATION: 98 % | HEART RATE: 62 BPM | HEIGHT: 62 IN | TEMPERATURE: 97 F

## 2024-05-24 NOTE — ASU PATIENT PROFILE, ADULT - NSICDXPASTSURGICALHX_GEN_ALL_CORE_FT
PAST SURGICAL HISTORY:  Elective surgery ovarian cyst excision    H/O gastric sleeve     History of appendectomy     History of bariatric surgery     History of ectopic pregnancy      PAST SURGICAL HISTORY:  Elective surgery ovarian cyst excision    H/O carpal tunnel repair right    H/O gastric sleeve     History of appendectomy     History of ectopic pregnancy

## 2024-05-24 NOTE — ASU PATIENT PROFILE, ADULT - NSICDXPASTMEDICALHX_GEN_ALL_CORE_FT
PAST MEDICAL HISTORY:  Anemia     Asthma > 3 MONTHS -MILD PER PT    GERD (gastroesophageal reflux disease)     History of bursitis left shoulder    History of carpal tunnel syndrome     History of diverticulosis     HTN (hypertension)      PAST MEDICAL HISTORY:  Anemia     Asthma > 3 MONTHS -MILD PER PT    GERD (gastroesophageal reflux disease)     History of bursitis left shoulder    History of carpal tunnel syndrome b/l    History of diverticulosis     HTN (hypertension)

## 2024-05-24 NOTE — ASU PATIENT PROFILE, ADULT - FALL HARM RISK - UNIVERSAL INTERVENTIONS
Bed in lowest position, wheels locked, appropriate side rails in place/Call bell, personal items and telephone in reach/Instruct patient to call for assistance before getting out of bed or chair/Non-slip footwear when patient is out of bed/[place] to call system/Physically safe environment - no spills, clutter or unnecessary equipment/Purposeful Proactive Rounding/Room/bathroom lighting operational, light cord in reach
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
## 2024-05-27 ENCOUNTER — TRANSCRIPTION ENCOUNTER (OUTPATIENT)
Age: 49
End: 2024-05-27

## 2024-05-28 ENCOUNTER — OUTPATIENT (OUTPATIENT)
Dept: OUTPATIENT SERVICES | Facility: HOSPITAL | Age: 49
LOS: 1 days | Discharge: HOME | End: 2024-05-28
Payer: COMMERCIAL

## 2024-05-28 ENCOUNTER — RESULT REVIEW (OUTPATIENT)
Age: 49
End: 2024-05-28

## 2024-05-28 ENCOUNTER — TRANSCRIPTION ENCOUNTER (OUTPATIENT)
Age: 49
End: 2024-05-28

## 2024-05-28 VITALS
SYSTOLIC BLOOD PRESSURE: 113 MMHG | TEMPERATURE: 98 F | RESPIRATION RATE: 16 BRPM | OXYGEN SATURATION: 99 % | DIASTOLIC BLOOD PRESSURE: 62 MMHG | HEART RATE: 65 BPM

## 2024-05-28 DIAGNOSIS — Z90.3 ACQUIRED ABSENCE OF STOMACH [PART OF]: Chronic | ICD-10-CM

## 2024-05-28 DIAGNOSIS — Z87.59 PERSONAL HISTORY OF OTHER COMPLICATIONS OF PREGNANCY, CHILDBIRTH AND THE PUERPERIUM: Chronic | ICD-10-CM

## 2024-05-28 DIAGNOSIS — Z98.890 OTHER SPECIFIED POSTPROCEDURAL STATES: Chronic | ICD-10-CM

## 2024-05-28 DIAGNOSIS — Z41.9 ENCOUNTER FOR PROCEDURE FOR PURPOSES OTHER THAN REMEDYING HEALTH STATE, UNSPECIFIED: Chronic | ICD-10-CM

## 2024-05-28 DIAGNOSIS — Z98.84 BARIATRIC SURGERY STATUS: Chronic | ICD-10-CM

## 2024-05-28 DIAGNOSIS — Z90.49 ACQUIRED ABSENCE OF OTHER SPECIFIED PARTS OF DIGESTIVE TRACT: Chronic | ICD-10-CM

## 2024-05-28 LAB
BLD GP AB SCN SERPL QL: NEGATIVE — SIGNIFICANT CHANGE UP
RH IG SCN BLD-IMP: POSITIVE — SIGNIFICANT CHANGE UP

## 2024-05-28 PROCEDURE — 86901 BLOOD TYPING SEROLOGIC RH(D): CPT

## 2024-05-28 PROCEDURE — 86850 RBC ANTIBODY SCREEN: CPT

## 2024-05-28 PROCEDURE — 88305 TISSUE EXAM BY PATHOLOGIST: CPT | Mod: 26

## 2024-05-28 PROCEDURE — 86900 BLOOD TYPING SEROLOGIC ABO: CPT

## 2024-05-28 PROCEDURE — 88305 TISSUE EXAM BY PATHOLOGIST: CPT

## 2024-05-28 PROCEDURE — 58558 HYSTEROSCOPY BIOPSY: CPT

## 2024-05-28 DEVICE — MYOSURE TISSUE REMOVAL DEVICE REACH: Type: IMPLANTABLE DEVICE | Status: FUNCTIONAL

## 2024-05-28 DEVICE — MYOSURE TISSUE REMOVAL DEVICE XL: Type: IMPLANTABLE DEVICE | Status: FUNCTIONAL

## 2024-05-28 RX ORDER — ALBUTEROL 90 UG/1
2 AEROSOL, METERED ORAL
Qty: 0 | Refills: 0 | DISCHARGE

## 2024-05-28 RX ORDER — OXYCODONE HYDROCHLORIDE 5 MG/1
5 TABLET ORAL EVERY 4 HOURS
Refills: 0 | Status: DISCONTINUED | OUTPATIENT
Start: 2024-05-28 | End: 2024-05-28

## 2024-05-28 RX ORDER — ACETAMINOPHEN 500 MG
1000 TABLET ORAL ONCE
Refills: 0 | Status: COMPLETED | OUTPATIENT
Start: 2024-05-28 | End: 2024-05-28

## 2024-05-28 RX ORDER — ACETAMINOPHEN 500 MG
1000 TABLET ORAL EVERY 6 HOURS
Refills: 0 | Status: DISCONTINUED | OUTPATIENT
Start: 2024-05-28 | End: 2024-05-28

## 2024-05-28 RX ORDER — SODIUM CHLORIDE 9 MG/ML
1000 INJECTION, SOLUTION INTRAVENOUS
Refills: 0 | Status: DISCONTINUED | OUTPATIENT
Start: 2024-05-28 | End: 2024-05-28

## 2024-05-28 RX ORDER — SIMETHICONE 80 MG/1
80 TABLET, CHEWABLE ORAL EVERY 6 HOURS
Refills: 0 | Status: DISCONTINUED | OUTPATIENT
Start: 2024-05-28 | End: 2024-05-28

## 2024-05-28 RX ORDER — OXYCODONE HYDROCHLORIDE 5 MG/1
10 TABLET ORAL EVERY 4 HOURS
Refills: 0 | Status: DISCONTINUED | OUTPATIENT
Start: 2024-05-28 | End: 2024-05-28

## 2024-05-28 RX ORDER — KETOROLAC TROMETHAMINE 30 MG/ML
30 SYRINGE (ML) INJECTION EVERY 6 HOURS
Refills: 0 | Status: DISCONTINUED | OUTPATIENT
Start: 2024-05-28 | End: 2024-05-28

## 2024-05-28 RX ORDER — OLMESARTAN MEDOXOMIL 5 MG/1
1 TABLET, FILM COATED ORAL
Refills: 0 | DISCHARGE

## 2024-05-28 RX ORDER — ONDANSETRON 8 MG/1
8 TABLET, FILM COATED ORAL EVERY 8 HOURS
Refills: 0 | Status: DISCONTINUED | OUTPATIENT
Start: 2024-05-28 | End: 2024-05-28

## 2024-05-28 RX ORDER — METOCLOPRAMIDE HCL 10 MG
10 TABLET ORAL EVERY 6 HOURS
Refills: 0 | Status: DISCONTINUED | OUTPATIENT
Start: 2024-05-28 | End: 2024-05-28

## 2024-05-28 RX ORDER — FAMOTIDINE 10 MG/ML
1 INJECTION INTRAVENOUS
Refills: 0 | DISCHARGE

## 2024-05-28 RX ADMIN — Medication 1000 MILLIGRAM(S): at 07:19

## 2024-05-28 NOTE — ASU DISCHARGE PLAN (ADULT/PEDIATRIC) - CARE PROVIDER_API CALL
Dylan Gutierrez)  Obstetrics and Gynecology  [address], [facility] [place] [address] [place] [address]  Phone: [phone]  Fax: [phone]  Follow Up Time:

## 2024-05-28 NOTE — ASU DISCHARGE PLAN (ADULT/PEDIATRIC) - CALL YOUR DOCTOR IF YOU HAVE ANY OF THE FOLLOWING:
Fever greater than (need to indicate Fahrenheit or Celsius)/Wound/Surgical Site with redness, or foul smelling discharge or pus/Unable to urinate/Inability to tolerate liquids or foods

## 2024-05-28 NOTE — BRIEF OPERATIVE NOTE - NSICDXBRIEFPROCEDURE_GEN_ALL_CORE_FT
PROCEDURES:  Hysteroscopy, with polypectomy, cervical dilation, and curettage procedure 28-May-2024 09:47:56  Mariola Marcum

## 2024-05-28 NOTE — PRE-ANESTHESIA EVALUATION ADULT - NSANTHTIREDRD_ENT_A_CORE
f/u blood  and urine cx,serial lactate levels,monitor vitals closley,ivfs hydration,monitor urine output and renal profile,iv abx as per id , No

## 2024-05-28 NOTE — BRIEF OPERATIVE NOTE - OPERATION/FINDINGS
Cervix dilated to #18 dilator. Uterus slightly tilted to right. Hysteroscope used to visualize both ostia. 3 polyps visualized and removed with myosure. D&C performed with banjo curette.  Cervix dilated to #18 dilator. Uterus slightly tilted to right. Hysteroscope used to visualize both ostia. 3 polyps visualized and removed with myosure. D&C performed with banjo curette. Fluid deficit 105cc.

## 2024-05-28 NOTE — ASU DISCHARGE PLAN (ADULT/PEDIATRIC) - BATHING
Onset of fever yesterday. Temp as high as 103. Tylenol last given at 13:00 today. No congestion or cough. No n/v/d. Mom states child sleeping a lot with decreased appetite. Child crying and uncooperative during triage.
Shower only

## 2024-05-28 NOTE — ASU DISCHARGE PLAN (ADULT/PEDIATRIC) - NS MD DC FALL RISK RISK
For information on Fall & Injury Prevention, visit: [email or web address] OR  [email or web address] OR  https://www.cdc.gov/steadi/patient.html

## 2024-05-29 LAB — SURGICAL PATHOLOGY STUDY: SIGNIFICANT CHANGE UP

## 2024-07-26 NOTE — ASU DISCHARGE PLAN (ADULT/PEDIATRIC) - NS MD DC FALL RISK RISK
For information on Fall & Injury Prevention, visit: [email or web address] OR  [email or web address] OR  https://www.cdc.gov/steadi/patient.html
Opt out

## 2025-01-07 NOTE — DISCUSSION/SUMMARY
Cultures noted  pain control  antibiotics  ID follow up  monitor temp and WBC
[de-identified] :  the patient did not get any relief from last injection.  She like to try second injection.  Today under sterile technique and with the patient's consent, I injected 1.5 cc dexamethasone with 5 cc of 1% lidocaine in the first dorsal compartment of the left wrist.  He tolerated this well.  The puncture site was covered with Band-Aid.  She is going to contact us in the future if she would like to proceed with the DeQuervein's release.  She understands she has to wait least a month.  All questions were answered today.
Normal for race

## 2025-03-05 NOTE — PRE-ANESTHESIA EVALUATION ADULT - HEIGHT IN FEET
Psychiatric Discharge Summary     Initial Evaluation: 5/8/2024  Last visit  Date: 6/13/2024    Discharge Diagnosis: ALYSHA, mood disorder, unspecified     Treating Physician: CHEN Michael      Presenting Problems/Pertinent Findings: ALYSHA, mood disorder, unspecified          Course of Treatment:  Patient was in treatment with this provider for 2 visits.        Subsequently, the patient withdrew from treatment.    Criteria for Discharge: Withdrew from Treatment and Change in insurance    Aftercare Recommendations: Follow up with pcp    Discharge Medications:   Current Outpatient Medications:     hydrOXYzine HCL (ATARAX) 25 mg tablet, Take 1 tablet (25 mg total) by mouth every 6 (six) hours as needed for itching, Disp: 30 tablet, Rfl: 0    norethindrone-ethinyl estradiol (JUNEL FE 1/20) 1-20 MG-MCG per tablet, Take 1 tablet by mouth daily, Disp: , Rfl:     ondansetron (ZOFRAN) 4 mg tablet, Take 4 mg by mouth every 8 (eight) hours as needed for nausea or vomiting, Disp: , Rfl:     pantoprazole (PROTONIX) 40 mg tablet, Take 40 mg by mouth daily, Disp: , Rfl:     Potassium 99 MG TABS, , Disp: , Rfl:     potassium chloride (KCl) 2 mEq/mL SOLN, Take 99 mg by mouth daily, Disp: , Rfl:     sertraline (Zoloft) 50 mg tablet, Take 1 tablet (50 mg total) by mouth daily, Disp: 30 tablet, Rfl: 0       Mental Status at Time of most recent visit on 6/13/2024 was stable.        
5

## (undated) DEVICE — WARMING BLANKET UPPER ADULT

## (undated) DEVICE — POSITIONER FOAM EGG CRATE ULNAR 2PCS (PINK)

## (undated) DEVICE — PACK D&C

## (undated) DEVICE — MYOSURE SCOPE SEAL

## (undated) DEVICE — GLV 7 PROTEXIS (WHITE)

## (undated) DEVICE — TUBING TUR 2 PRONG

## (undated) DEVICE — TUBING STRYKER HYSTEROSCOPY INFLOW OUTFLOW

## (undated) DEVICE — TUBING AQUILEX OUTFLOW

## (undated) DEVICE — VENODYNE/SCD SLEEVE CALF MEDIUM